# Patient Record
Sex: FEMALE | URBAN - NONMETROPOLITAN AREA
[De-identification: names, ages, dates, MRNs, and addresses within clinical notes are randomized per-mention and may not be internally consistent; named-entity substitution may affect disease eponyms.]

---

## 2021-08-03 ENCOUNTER — TELEPHONE (OUTPATIENT)
Dept: PREADMISSION TESTING | Facility: HOSPITAL | Age: 36
End: 2021-08-03

## 2021-08-03 VITALS — HEIGHT: 63 IN | BODY MASS INDEX: 32.43 KG/M2 | WEIGHT: 183 LBS

## 2021-08-03 RX ORDER — SUMATRIPTAN 50 MG/1
50 TABLET, FILM COATED ORAL ONCE AS NEEDED
COMMUNITY

## 2021-08-03 RX ORDER — LOSARTAN POTASSIUM 25 MG/1
25 TABLET ORAL DAILY
COMMUNITY

## 2021-08-03 RX ORDER — MONTELUKAST SODIUM 10 MG/1
10 TABLET ORAL
COMMUNITY

## 2021-08-03 RX ORDER — AZITHROMYCIN 250 MG/1
250 TABLET, FILM COATED ORAL
COMMUNITY

## 2021-08-03 RX ORDER — ALBUTEROL SULFATE 90 UG/1
2 AEROSOL, METERED RESPIRATORY (INHALATION) EVERY 4 HOURS PRN
COMMUNITY

## 2021-08-03 RX ORDER — AZELASTINE 1 MG/ML
2 SPRAY, METERED NASAL 2 TIMES DAILY
COMMUNITY

## 2021-08-03 RX ORDER — DOCUSATE SODIUM 100 MG/1
100 CAPSULE, LIQUID FILLED ORAL 2 TIMES DAILY
COMMUNITY

## 2021-08-03 RX ORDER — BUDESONIDE 0.25 MG/2ML
0.25 INHALANT ORAL AS NEEDED
COMMUNITY

## 2021-08-03 RX ORDER — FAMOTIDINE 20 MG/1
20 TABLET, FILM COATED ORAL DAILY
COMMUNITY

## 2021-08-03 RX ORDER — BUDESONIDE AND FORMOTEROL FUMARATE DIHYDRATE 160; 4.5 UG/1; UG/1
2 AEROSOL RESPIRATORY (INHALATION) 2 TIMES DAILY
COMMUNITY

## 2021-08-03 RX ORDER — CETIRIZINE HYDROCHLORIDE 10 MG/1
10 TABLET ORAL DAILY
COMMUNITY

## 2021-08-03 RX ORDER — ACETAMINOPHEN 500 MG
1000 TABLET ORAL EVERY 6 HOURS PRN
COMMUNITY

## 2021-08-03 NOTE — PRE-PROCEDURE INSTRUCTIONS
Pre-Surgery Instructions:   Medication Instructions    acetaminophen (TYLENOL) 500 mg tablet Instructed patient per Anesthesia Guidelines   albuterol (PROVENTIL HFA,VENTOLIN HFA) 90 mcg/act inhaler Instructed patient per Anesthesia Guidelines   azelastine (ASTELIN) 0 1 % nasal spray Instructed patient per Anesthesia Guidelines   azithromycin (ZITHROMAX) 250 mg tablet Instructed patient per Anesthesia Guidelines   budesonide (PULMICORT) 0 25 mg/2 mL nebulizer solution Instructed patient per Anesthesia Guidelines   budesonide-formoterol (SYMBICORT) 160-4 5 mcg/act inhaler Instructed patient per Anesthesia Guidelines   cetirizine (ZyrTEC) 10 mg tablet Instructed patient per Anesthesia Guidelines   docusate sodium (Colace) 100 mg capsule Instructed patient per Anesthesia Guidelines   famotidine (PEPCID) 20 mg tablet Instructed patient per Anesthesia Guidelines   Lactobacillus (FLORAJEN ACIDOPHILUS PO) Instructed patient per Anesthesia Guidelines   losartan (COZAAR) 25 mg tablet Instructed patient per Anesthesia Guidelines   montelukast (SINGULAIR) 10 mg tablet Instructed patient per Anesthesia Guidelines   Polyethylene Glycol 3350 (MIRALAX PO) Instructed patient per Anesthesia Guidelines   SUMAtriptan (IMITREX) 50 mg tablet Instructed patient per Anesthesia Guidelines  Pt was instructed to take am medications with final prep of gatorade and miralax